# Patient Record
Sex: FEMALE | Race: BLACK OR AFRICAN AMERICAN | NOT HISPANIC OR LATINO | URBAN - NONMETROPOLITAN AREA
[De-identification: names, ages, dates, MRNs, and addresses within clinical notes are randomized per-mention and may not be internally consistent; named-entity substitution may affect disease eponyms.]

---

## 2019-07-01 ENCOUNTER — IMPORTED ENCOUNTER (OUTPATIENT)
Dept: URBAN - NONMETROPOLITAN AREA CLINIC 1 | Facility: CLINIC | Age: 79
End: 2019-07-01

## 2019-07-01 PROBLEM — H40.1131: Noted: 2018-11-20

## 2019-07-01 PROBLEM — H35.372: Noted: 2018-11-20

## 2019-07-01 PROBLEM — H52.4: Noted: 2018-11-20

## 2019-07-01 PROBLEM — Z96.1: Noted: 2018-11-20

## 2019-07-01 PROBLEM — H02.831: Noted: 2019-07-01

## 2019-07-01 PROBLEM — H02.834: Noted: 2019-07-01

## 2019-07-01 PROBLEM — H43.813: Noted: 2018-11-20

## 2019-07-01 PROCEDURE — 92014 COMPRE OPH EXAM EST PT 1/>: CPT

## 2019-07-01 PROCEDURE — 76514 ECHO EXAM OF EYE THICKNESS: CPT

## 2019-07-01 PROCEDURE — 92083 EXTENDED VISUAL FIELD XM: CPT

## 2019-07-01 PROCEDURE — 92015 DETERMINE REFRACTIVE STATE: CPT

## 2019-07-01 NOTE — PATIENT DISCUSSION
POAG OU mild- Discussed diagnosis in detail with patient- Cup to Disc noted at OD . 4 and OS . 5- IOP stable at 12 OU- OCT done previously stable from previous:  Slight NFL thinning Temporal OD and Inferior / Temporal NFL thinning - VF done today OD shows poor field with Borderline Inferior defects and OS shows Fair field with Nasal defects - PACHY done today  and - Patient is stable without drops at this time (been off drops since 2013 by Dr. Myah Morgan) - Continue to monitor every 6 months or PRN- RTC 6 months F/U with OCT MAC and ONH per Dr. Mary Maza ERM OS - Discussed diagnosis in detail with patient- Patient to follow the [facility] call or come into the office if any changes noted from today. Grid given with instructions on how to use. 5/21/18- OCT done 5/2018 showed stable ERM OS - Continue to monitor every 6 months- RTC 6 months F/U with OCT MAC Pseudophakia OU PCO OU- Discussed diagnosis in detail with patient- Both intraocular lenses in place and stable- S/P Yag PC OD 4/26/17 and OS 3/22/17- Good central opening- Continue to monitorFloaters OU- Discussed  signs/symptoms of RD or tear. - Discussed in detail the nature of flashes/floaters and to call if there is a sudden increase in their number.- Continue to monitorDermatochalasis - Discussed diagnosis in detail with patient- No superior field of vision loss- Continue to monitorPresbyopia OU- Discussed diagnosis in detail with patient- New glasses Rx given today- Continue to monitor; Dr's Notes: MR 7/1/19DFE  7/1/19VF 7/1/19Optos 5/29/13OCT disc   11/20/18OCT mac 5/21/18GonioPACH 7/1/19

## 2020-01-17 ENCOUNTER — IMPORTED ENCOUNTER (OUTPATIENT)
Dept: URBAN - NONMETROPOLITAN AREA CLINIC 1 | Facility: CLINIC | Age: 80
End: 2020-01-17

## 2020-01-17 PROCEDURE — 92134 CPTRZ OPH DX IMG PST SGM RTA: CPT

## 2020-01-17 PROCEDURE — 92014 COMPRE OPH EXAM EST PT 1/>: CPT

## 2020-01-17 NOTE — PATIENT DISCUSSION
POAG OU mild- Discussed diagnosis in detail with patient- Cup to Disc noted at OD . 4 and OS . 5- IOP stable at 12 OU- OCT done previously stable from previous:  Slight NFL thinning Temporal OD and Inferior / Temporal NFL thinning - VF done previously OD shows poor field with Borderline Inferior defects and OS shows Fair field with Nasal defects - PACHY done previously  and - Patient is stable without drops at this time (been off drops since 2013 by Dr. Karolina Ruiz) - Continue to monitor every 6 months or PRN- RTC 4 months F/U with OCT ONH repeat after DFE ERM OS - Discussed diagnosis in detail with patient- Patient to follow the [facility] call or come into the office if any changes noted from today. Grid given with instructions on how to use. 5/21/18- OCT done today shows stable ERM OS- Continue to monitor  Pseudophakia OU PCO OU- Discussed diagnosis in detail with patient- Both intraocular lenses in place and stable- S/P Yag PC OD 4/26/17 and OS 3/22/17- Good central opening- Continue to monitorFloaters OU- Discussed  signs/symptoms of RD or tear. - Discussed in detail the nature of flashes/floaters and to call if there is a sudden increase in their number.- Continue to monitorDermatochalasis - Discussed diagnosis in detail with patient- No superior field of vision loss- Continue to monitorPresbyopia OU- Discussed diagnosis in detail with patient- Continue to monitor; Dr's Notes: MR 7/1/19DFE  7/1/19VF 7/1/19Optos 5/29/13OCT disc   11/20/18OCT mac 1/17/20GonioPACH 7/1/19

## 2020-06-12 ENCOUNTER — IMPORTED ENCOUNTER (OUTPATIENT)
Dept: URBAN - NONMETROPOLITAN AREA CLINIC 1 | Facility: CLINIC | Age: 80
End: 2020-06-12

## 2020-06-12 PROCEDURE — 92133 CPTRZD OPH DX IMG PST SGM ON: CPT

## 2020-06-12 PROCEDURE — 92014 COMPRE OPH EXAM EST PT 1/>: CPT

## 2020-06-12 NOTE — PATIENT DISCUSSION
POAG OU mild- Discussed diagnosis in detail with patient- Cup to Disc noted at OD . 4 and OS . 5- IOP stable at OD 15 and OS 14- OCT done today OD Temporal NFL thinning and OS shows Borderline Superior and Inferior NFL thinning - VF done previously OD shows poor field with Borderline Inferior defects and OS shows Fair field with Nasal defects - PACHY done previously  and - Patient is stable without drops at this time (been off drops since 2013 by Dr. Danny Riggs) - Continue to monitor  ERM OS - Discussed diagnosis in detail with patient- Patient to follow the [address] [facility] [email or web address] call or come into the office if any changes noted from today. Grid given with instructions on how to use. 5/21/18- OCT done previously shows stable ERM OS- Continue to monitor  Pseudophakia OU PCO OU- Discussed diagnosis in detail with patient- Both intraocular lenses in place and stable- S/P Yag PC OD 4/26/17 and OS 3/22/17 Good central opening- Continue to monitorFloaters OU- Discussed  signs/symptoms of RD or tear. - Discussed in detail the nature of flashes/floaters and to call if there is a sudden increase in their number.- Continue to monitorDermatochalasis - Discussed diagnosis in detail with patient- No superior field of vision loss- Continue to monitorPresbyopia OU- Discussed diagnosis in detail with patient- New glasses RX given today - Continue to monitor; Dr's Notes: MR 6/12/20DFE 6/12/20VF 7/1/19Optos 5/29/13OCT disc 6/12/20OCT mac 1/17/20GonioPACH 7/1/19

## 2020-07-14 NOTE — PATIENT DISCUSSION
Patient needs consult with a comprehensive ophthalmologist or Glaucoma specialist in the next 48 hours. He will need an IOP check, full glaucoma evaluation, and possible LPI. The patient follow-ups at the [place] and may obtain his care there.

## 2020-07-14 NOTE — PATIENT DISCUSSION
A drop of proparacaine was  followed by 5% betadine was instilled in the left eye. At the slit lamp, a 30 gauge needle was used to enter the anterior chamber and was kept inside for 30 secnds to drain aqueous. At the conclusion of the procedure, the IOP measured 9 mmHg. The wound was found to be jc negative. A drop of Brinzolamide and Oflaxacin was installed at the end of the prosedure. The patient tolorated the procedure well with no complications.

## 2020-07-14 NOTE — PATIENT DISCUSSION
R/B/Aof anterior chamber paracentesis was discussed with patient. I explained that intraocular pressure is dangerously high and that I could do an anterior chamber tap to relieve some of the pressure, although there are  risks with entering the eye including infection. The patient agreed to proceed and proper informed consent was obtained and documented.

## 2020-07-14 NOTE — PATIENT DISCUSSION
The patients IOP was 71mmHg when I checked, this was 30 minutes after the ED infused 500 mg of IV Acetazolamide and after I instilled Timolol, Bromonidine, and latanoprost drops. 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 I explained that visual potential is limited.

## 2020-07-14 NOTE — PATIENT DISCUSSION
Will put patient on Timolol qday, BrimonidineTID, Latanoprost qday, Pilocarpine BID, Prednisolone forte TID fo i week and Oflaxacin/cipro/moxi QID for 5 days.

## 2021-02-02 ENCOUNTER — IMPORTED ENCOUNTER (OUTPATIENT)
Dept: URBAN - NONMETROPOLITAN AREA CLINIC 1 | Facility: CLINIC | Age: 81
End: 2021-02-02

## 2021-02-02 PROCEDURE — 92014 COMPRE OPH EXAM EST PT 1/>: CPT

## 2021-02-02 PROCEDURE — 92083 EXTENDED VISUAL FIELD XM: CPT

## 2021-02-02 NOTE — PATIENT DISCUSSION
POAG OU mild- Discussed diagnosis in detail with patient- Cup to Disc noted at OD . 4 and OS . 5- IOP stable at OD 15 and OS 14- OCT done previously OD Temporal NFL thinning and OS shows Borderline Superior and Inferior NFL thinning - VF done today OD shows good field with Enlarged Blind Spot and Nasal Defect and OS shows Fair field with Inferior Nasal Step  - PACHY done previously  and - Re-start Latanoprost QHS OU due to progression in VF today - Continue to monitor - RTC 6 months F/U with OCT ONH  ERM OS - Discussed diagnosis in detail with patient- Patient to follow the [facility] [employer] [facility] [employer] call or come into the office if any changes noted from today. Grid given with instructions on how to use. 5/21/18- OCT done previously shows stable ERM OS- Continue to monitor  Pseudophakia OU PCO OU- Discussed diagnosis in detail with patient- Both intraocular lenses in place and stable- S/P Yag PC OD 4/26/17 and OS 3/22/17 Good central opening- Continue to monitorFloaters OU- Discussed  signs/symptoms of RD or tear. - Discussed in detail the nature of flashes/floaters and to call if there is a sudden increase in their number.- Continue to monitorDermatochalasis - Discussed diagnosis in detail with patient- No superior field of vision loss- Continue to monitorPresbyopia OU- Discussed diagnosis in detail with patient- Continue to monitor; 's Notes: MR 6/12/20DFE 6/12/20VF 2/2/21Optos 5/29/13OCT disc 6/12/20OCT mac 1/17/20GonioPACH 7/1/19

## 2021-08-20 ENCOUNTER — PREPPED CHART (OUTPATIENT)
Dept: URBAN - NONMETROPOLITAN AREA CLINIC 1 | Facility: CLINIC | Age: 81
End: 2021-08-20

## 2021-08-20 ENCOUNTER — IMPORTED ENCOUNTER (OUTPATIENT)
Dept: URBAN - NONMETROPOLITAN AREA CLINIC 1 | Facility: CLINIC | Age: 81
End: 2021-08-20

## 2021-08-20 PROCEDURE — 92133 CPTRZD OPH DX IMG PST SGM ON: CPT

## 2021-08-20 PROCEDURE — 99213 OFFICE O/P EST LOW 20 MIN: CPT

## 2021-08-20 NOTE — PATIENT DISCUSSION
POAG OU mild- Discussed diagnosis in detail with patient- Cup to Disc noted at OD . 4 and OS . 5- IOP stable at OD 12 and OS 14- OCT done today OD Temporal NFL thinning and OS shows [place] Superior and Temporal NFL thinning - VF done previously OD shows good field with Enlarged Blind Spot and Nasal Defect and OS shows Fair field with Inferior Nasal Step  - PACHY done previously  and - Continue Latanoprost QHS OU- Continue to monitor - RTC 6 months F/U with VF and Complete   ERM OS - Discussed diagnosis in detail with patient- Patient to follow the [employer] [employer] call or come into the office if any changes noted from today. Grid given with instructions on how to use. 5/21/18- OCT done previously shows stable ERM OS- Continue to monitor  Pseudophakia OU PCO OU- Discussed diagnosis in detail with patient- Both intraocular lenses in place and stable- S/P Yag PC OD 4/26/17 and OS 3/22/17 Good central opening- Continue to monitorFloaters OU- Discussed  signs/symptoms of RD or tear. - Discussed in detail the nature of flashes/floaters and to call if there is a sudden increase in their number.- Continue to monitorDermatochalasis - Discussed diagnosis in detail with patient- No superior field of vision loss- Continue to monitorPresbyopia OU- Discussed diagnosis in detail with patient- Continue to monitor; 's Notes: MR 6/12/20DFE 6/12/20VF 2/2/21Optos 5/29/13OCT disc 8/20/21OCT mac 1/17/20GonioPACH 7/1/19

## 2022-03-31 ASSESSMENT — VISUAL ACUITY
OD_SC: 20/20
OS_SC: 20/20

## 2022-03-31 ASSESSMENT — TONOMETRY
OD_IOP_MMHG: 12
OS_IOP_MMHG: 14

## 2022-04-07 ENCOUNTER — FOLLOW UP (OUTPATIENT)
Dept: URBAN - NONMETROPOLITAN AREA CLINIC 1 | Facility: CLINIC | Age: 82
End: 2022-04-07

## 2022-04-07 DIAGNOSIS — H40.1131: ICD-10-CM

## 2022-04-07 DIAGNOSIS — H35.372: ICD-10-CM

## 2022-04-07 PROCEDURE — 99214 OFFICE O/P EST MOD 30 MIN: CPT

## 2022-04-07 PROCEDURE — 92083 EXTENDED VISUAL FIELD XM: CPT

## 2022-04-07 ASSESSMENT — TONOMETRY
OS_IOP_MMHG: 15
OD_IOP_MMHG: 14

## 2022-04-07 ASSESSMENT — VISUAL ACUITY
OS_SC: 20/25
OD_SC: 20/30-1

## 2022-04-09 ASSESSMENT — VISUAL ACUITY
OS_CC: 20/25-
OD_PH: 20/25
OD_CC: 20/30-2
OS_CC: 20/25-
OS_CC: 20/25-
OD_CC: 20/20
OS_CC: 20/20
OD_CC: 20/29-
OD_CC: 20/32+
OD_CC: 20/40
OS_CC: 20/25-

## 2022-04-09 ASSESSMENT — TONOMETRY
OD_IOP_MMHG: 12
OS_IOP_MMHG: 12
OS_IOP_MMHG: 12
OD_IOP_MMHG: 12
OS_IOP_MMHG: 14
OD_IOP_MMHG: 12
OS_IOP_MMHG: 14
OS_IOP_MMHG: 14
OD_IOP_MMHG: 15
OD_IOP_MMHG: 15

## 2022-04-09 ASSESSMENT — PACHYMETRY
OS_CT_UM: 539; ADJ: THIN
OD_CT_UM: 552; ADJ: THIN
OS_CT_UM: 539; ADJ: THIN
OD_CT_UM: 552; ADJ: THIN
OS_CT_UM: 539; ADJ: THIN
OD_CT_UM: 552; ADJ: THIN
OS_CT_UM: 539; ADJ: THIN
OD_CT_UM: 552; ADJ: THIN
OD_CT_UM: 552; ADJ: THIN
OS_CT_UM: 539; ADJ: THIN

## 2022-09-01 NOTE — PATIENT DISCUSSION
STRESSED IMPORTANCE OF TAKING DROP. PT UNDERSTANDS THAT GLAUCOMA UNTREATED LEADS TO IRRIVERSIBLE BLINDNESS.

## 2022-09-01 NOTE — PATIENT DISCUSSION
CONSERVATIVE TX TO KEEP PT COMFORTABLE.  PT COMPLAINS OF INTERMITTANT PAIN THAT HE CAN TOLERATE BUT WILL CALL IF THAT WORSENS. CPM.

## 2022-10-17 ENCOUNTER — FOLLOW UP (OUTPATIENT)
Dept: URBAN - NONMETROPOLITAN AREA CLINIC 1 | Facility: CLINIC | Age: 82
End: 2022-10-17

## 2022-10-17 DIAGNOSIS — H40.1131: ICD-10-CM

## 2022-10-17 PROCEDURE — 92133 CPTRZD OPH DX IMG PST SGM ON: CPT

## 2022-10-17 PROCEDURE — 99214 OFFICE O/P EST MOD 30 MIN: CPT

## 2022-10-17 ASSESSMENT — VISUAL ACUITY
OS_SC: 20/30-1
OD_SC: 20/40-1
OD_PH: 20/30

## 2022-10-17 ASSESSMENT — TONOMETRY
OS_IOP_MMHG: 14
OD_IOP_MMHG: 14

## 2023-04-18 ENCOUNTER — FOLLOW UP (OUTPATIENT)
Dept: URBAN - NONMETROPOLITAN AREA CLINIC 1 | Facility: CLINIC | Age: 83
End: 2023-04-18

## 2023-04-18 DIAGNOSIS — H43.813: ICD-10-CM

## 2023-04-18 DIAGNOSIS — H40.1131: ICD-10-CM

## 2023-04-18 DIAGNOSIS — H35.373: ICD-10-CM

## 2023-04-18 PROCEDURE — 99214 OFFICE O/P EST MOD 30 MIN: CPT

## 2023-04-18 PROCEDURE — 92134 CPTRZ OPH DX IMG PST SGM RTA: CPT

## 2023-04-18 ASSESSMENT — VISUAL ACUITY
OD_SC: 20/40-2
OS_SC: 20/25-2
OU_SC: 20/25-1
OD_PH: 20/22-2

## 2023-04-18 ASSESSMENT — TONOMETRY
OD_IOP_MMHG: 14
OS_IOP_MMHG: 14

## 2023-10-31 ENCOUNTER — FOLLOW UP (OUTPATIENT)
Dept: URBAN - NONMETROPOLITAN AREA CLINIC 1 | Facility: CLINIC | Age: 83
End: 2023-10-31

## 2023-10-31 DIAGNOSIS — H40.1131: ICD-10-CM

## 2023-10-31 PROCEDURE — 92133 CPTRZD OPH DX IMG PST SGM ON: CPT

## 2023-10-31 PROCEDURE — 99214 OFFICE O/P EST MOD 30 MIN: CPT

## 2023-10-31 PROCEDURE — 92083 EXTENDED VISUAL FIELD XM: CPT

## 2023-10-31 ASSESSMENT — VISUAL ACUITY
OS_SC: 20/25
OD_SC: 20/30

## 2023-10-31 ASSESSMENT — TONOMETRY
OD_IOP_MMHG: 14
OS_IOP_MMHG: 14

## 2024-08-21 ENCOUNTER — COMPREHENSIVE EXAM (OUTPATIENT)
Dept: URBAN - NONMETROPOLITAN AREA CLINIC 1 | Facility: CLINIC | Age: 84
End: 2024-08-21

## 2024-08-21 DIAGNOSIS — H52.4: ICD-10-CM

## 2024-08-21 PROCEDURE — 92014 COMPRE OPH EXAM EST PT 1/>: CPT

## 2024-08-21 PROCEDURE — 92015 DETERMINE REFRACTIVE STATE: CPT

## 2024-08-21 ASSESSMENT — VISUAL ACUITY
OS_PH: 20/25-1
OD_PH: 20/25-1
OD_SC: 20/40
OU_SC: 20/30
OS_SC: 20/30-1

## 2024-08-21 ASSESSMENT — TONOMETRY
OD_IOP_MMHG: 14
OS_IOP_MMHG: 14

## 2025-02-25 ENCOUNTER — FOLLOW UP (OUTPATIENT)
Age: 85
End: 2025-02-25

## 2025-02-25 DIAGNOSIS — H35.373: ICD-10-CM

## 2025-02-25 DIAGNOSIS — H40.1131: ICD-10-CM

## 2025-02-25 PROCEDURE — 99213 OFFICE O/P EST LOW 20 MIN: CPT

## 2025-02-25 PROCEDURE — 92133 CPTRZD OPH DX IMG PST SGM ON: CPT

## 2025-02-25 PROCEDURE — 92083 EXTENDED VISUAL FIELD XM: CPT

## 2025-03-11 ENCOUNTER — EMERGENCY VISIT (OUTPATIENT)
Age: 85
End: 2025-03-11

## 2025-03-11 DIAGNOSIS — H11.31: ICD-10-CM

## 2025-03-11 PROCEDURE — 99213 OFFICE O/P EST LOW 20 MIN: CPT
